# Patient Record
Sex: MALE | Race: WHITE | NOT HISPANIC OR LATINO | URBAN - METROPOLITAN AREA
[De-identification: names, ages, dates, MRNs, and addresses within clinical notes are randomized per-mention and may not be internally consistent; named-entity substitution may affect disease eponyms.]

---

## 2024-01-25 ENCOUNTER — OFFICE VISIT (OUTPATIENT)
Dept: URBAN - METROPOLITAN AREA CLINIC 46 | Facility: CLINIC | Age: 64
End: 2024-01-25
Payer: MEDICARE

## 2024-01-25 VITALS
SYSTOLIC BLOOD PRESSURE: 110 MMHG | HEIGHT: 69 IN | TEMPERATURE: 98.4 F | WEIGHT: 170.2 LBS | BODY MASS INDEX: 25.21 KG/M2 | DIASTOLIC BLOOD PRESSURE: 62 MMHG | HEART RATE: 71 BPM

## 2024-01-25 DIAGNOSIS — R13.13 DYSPHAGIA, PHARYNGEAL PHASE: ICD-10-CM

## 2024-01-25 PROCEDURE — 99204 OFFICE O/P NEW MOD 45 MIN: CPT | Performed by: INTERNAL MEDICINE

## 2024-01-25 RX ORDER — DICYCLOMINE HYDROCHLORIDE 20 MG/1
1 TABLET TABLET ORAL THREE TIMES A DAY
Qty: 30 TABLET | Status: ACTIVE | COMMUNITY

## 2024-01-25 RX ORDER — SIMETHICONE 125 MG
1 CAPSULE AFTER MEALS AND AT BEDTIME AS NEEDED CAPSULE ORAL
Status: ACTIVE | COMMUNITY
Start: 2024-01-25

## 2024-01-25 RX ORDER — APIXABAN 5 MG/1
1 TABLET TABLET, FILM COATED ORAL TWICE A DAY
Status: ACTIVE | COMMUNITY

## 2024-01-25 RX ORDER — METOCLOPRAMIDE 10 MG/1
1 TABLET BEFORE MEALS TABLET ORAL TWICE A DAY
Qty: 16 TABLET | Status: ACTIVE | COMMUNITY

## 2024-01-25 NOTE — HPI-TODAY'S VISIT:
62-year-old male is here for evaluation of dysphagia.  He is having dysphagia particularly to solids with steak chicken for the past month.  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  He is able to swallow soft foods like mashed potatoes.

## 2024-01-26 ENCOUNTER — TELEPHONE ENCOUNTER (OUTPATIENT)
Dept: URBAN - METROPOLITAN AREA CLINIC 46 | Facility: CLINIC | Age: 64
End: 2024-01-26

## 2024-01-28 ENCOUNTER — LAB OUTSIDE AN ENCOUNTER (OUTPATIENT)
Dept: URBAN - METROPOLITAN AREA CLINIC 46 | Facility: CLINIC | Age: 64
End: 2024-01-28

## 2024-01-30 ENCOUNTER — CLAIMS CREATED FROM THE CLAIM WINDOW (OUTPATIENT)
Dept: URBAN - METROPOLITAN AREA CLINIC 4 | Facility: CLINIC | Age: 64
End: 2024-01-30
Payer: MEDICARE

## 2024-01-30 ENCOUNTER — CLAIMS CREATED FROM THE CLAIM WINDOW (OUTPATIENT)
Dept: URBAN - METROPOLITAN AREA SURGERY CENTER 28 | Facility: SURGERY CENTER | Age: 64
End: 2024-01-30
Payer: MEDICARE

## 2024-01-30 ENCOUNTER — LAB OUTSIDE AN ENCOUNTER (OUTPATIENT)
Dept: URBAN - METROPOLITAN AREA CLINIC 44 | Facility: CLINIC | Age: 64
End: 2024-01-30

## 2024-01-30 ENCOUNTER — LAB OUTSIDE AN ENCOUNTER (OUTPATIENT)
Dept: URBAN - METROPOLITAN AREA CLINIC 46 | Facility: CLINIC | Age: 64
End: 2024-01-30

## 2024-01-30 ENCOUNTER — TELEPHONE ENCOUNTER (OUTPATIENT)
Dept: URBAN - METROPOLITAN AREA CLINIC 44 | Facility: CLINIC | Age: 64
End: 2024-01-30

## 2024-01-30 DIAGNOSIS — R13.19 CERVICAL DYSPHAGIA: ICD-10-CM

## 2024-01-30 DIAGNOSIS — C15.5 ADENOCARCINOMA OF LOWER ESOPHAGUS: ICD-10-CM

## 2024-01-30 DIAGNOSIS — K22.89 OTHER SPECIFIED DISEASE OF ESOPHAGUS: ICD-10-CM

## 2024-01-30 DIAGNOSIS — C15.9 MALIGNANT NEOPLASM OF ESOPHAGUS, UNSPECIFIED: ICD-10-CM

## 2024-01-30 DIAGNOSIS — C15.5 MALIGNANT NEOPLASM OF LOWER THIRD OF ESOPHAGUS: ICD-10-CM

## 2024-01-30 DIAGNOSIS — R13.10 DYSPHAGIA: ICD-10-CM

## 2024-01-30 PROCEDURE — 43239 EGD BIOPSY SINGLE/MULTIPLE: CPT | Performed by: INTERNAL MEDICINE

## 2024-01-30 PROCEDURE — 88305 TISSUE EXAM BY PATHOLOGIST: CPT | Performed by: PATHOLOGY

## 2024-01-30 PROCEDURE — G8907 PT DOC NO EVENTS ON DISCHARG: HCPCS | Performed by: INTERNAL MEDICINE

## 2024-01-30 PROCEDURE — 88341 IMHCHEM/IMCYTCHM EA ADD ANTB: CPT | Performed by: PATHOLOGY

## 2024-01-30 PROCEDURE — 00731 ANES UPR GI NDSC PX NOS: CPT | Performed by: NURSE ANESTHETIST, CERTIFIED REGISTERED

## 2024-01-30 PROCEDURE — 88342 IMHCHEM/IMCYTCHM 1ST ANTB: CPT | Performed by: PATHOLOGY

## 2024-01-30 RX ORDER — APIXABAN 5 MG/1
1 TABLET TABLET, FILM COATED ORAL TWICE A DAY
Status: ACTIVE | COMMUNITY

## 2024-01-30 RX ORDER — METOCLOPRAMIDE 10 MG/1
1 TABLET BEFORE MEALS TABLET ORAL TWICE A DAY
Qty: 16 TABLET | Status: ACTIVE | COMMUNITY

## 2024-01-30 RX ORDER — DICYCLOMINE HYDROCHLORIDE 20 MG/1
1 TABLET TABLET ORAL THREE TIMES A DAY
Qty: 30 TABLET | Status: ACTIVE | COMMUNITY

## 2024-01-30 RX ORDER — SIMETHICONE 125 MG
1 CAPSULE AFTER MEALS AND AT BEDTIME AS NEEDED CAPSULE ORAL
Status: ACTIVE | COMMUNITY
Start: 2024-01-25

## 2024-01-31 ENCOUNTER — TELEPHONE ENCOUNTER (OUTPATIENT)
Dept: URBAN - METROPOLITAN AREA CLINIC 44 | Facility: CLINIC | Age: 64
End: 2024-01-31

## 2024-02-06 ENCOUNTER — LAB (OUTPATIENT)
Dept: URBAN - METROPOLITAN AREA CLINIC 46 | Facility: CLINIC | Age: 64
End: 2024-02-06

## 2024-03-29 ENCOUNTER — TELPHEP (OUTPATIENT)
Dept: URBAN - METROPOLITAN AREA TELEHEALTH 2 | Facility: TELEHEALTH | Age: 64
End: 2024-03-29
Payer: MEDICARE

## 2024-03-29 ENCOUNTER — LAB (OUTPATIENT)
Dept: URBAN - METROPOLITAN AREA TELEHEALTH 2 | Facility: TELEHEALTH | Age: 64
End: 2024-03-29

## 2024-03-29 DIAGNOSIS — K57.90 DIVERTICULOSIS: ICD-10-CM

## 2024-03-29 DIAGNOSIS — Z86.010 PERSONAL HISTORY OF COLONIC POLYPS: ICD-10-CM

## 2024-03-29 DIAGNOSIS — K62.7 RADIATION PROCTITIS: ICD-10-CM

## 2024-03-29 DIAGNOSIS — K76.9 LIVER LESION: ICD-10-CM

## 2024-03-29 DIAGNOSIS — C15.5 MALIGNANT NEOPLASM OF LOWER THIRD OF ESOPHAGUS: ICD-10-CM

## 2024-03-29 DIAGNOSIS — K62.5 RECTAL BLEEDING: ICD-10-CM

## 2024-03-29 PROBLEM — 300331000: Status: ACTIVE | Noted: 2024-03-29

## 2024-03-29 PROBLEM — 12063002: Status: ACTIVE | Noted: 2024-03-29

## 2024-03-29 PROBLEM — 397881000: Status: ACTIVE | Noted: 2024-03-29

## 2024-03-29 PROBLEM — 235760009: Status: ACTIVE | Noted: 2024-03-29

## 2024-03-29 PROBLEM — 428283002: Status: ACTIVE | Noted: 2024-03-29

## 2024-03-29 PROCEDURE — 99214 OFFICE O/P EST MOD 30 MIN: CPT | Performed by: NURSE PRACTITIONER

## 2024-03-29 RX ORDER — DICYCLOMINE HYDROCHLORIDE 20 MG/1
1 TABLET TABLET ORAL THREE TIMES A DAY
Qty: 30 TABLET | Status: ACTIVE | COMMUNITY

## 2024-03-29 RX ORDER — SIMETHICONE 125 MG
1 CAPSULE AFTER MEALS AND AT BEDTIME AS NEEDED CAPSULE ORAL
Status: ACTIVE | COMMUNITY
Start: 2024-01-25

## 2024-03-29 RX ORDER — METOCLOPRAMIDE 10 MG/1
1 TABLET BEFORE MEALS TABLET ORAL TWICE A DAY
Qty: 16 TABLET | Status: ACTIVE | COMMUNITY

## 2024-03-29 RX ORDER — APIXABAN 5 MG/1
1 TABLET TABLET, FILM COATED ORAL TWICE A DAY
Status: ACTIVE | COMMUNITY

## 2024-03-29 NOTE — HPI-TODAY'S VISIT:
63-year-old male presents via telehealth for evaluation of rectal bleeding. Bowel movements are daily with bright red blood with or without a bowel movement. A colonoscopy was recommended to rule out neoplasm. Last colonoscopy was 6/2023 per Dr. Rivera, at which time he had multiple tubular adenomas, diverticulitis, radiation proctitis, and internal hemorrhoids. Endo clips were placed after polyp removal. A 1-year recall was recommended. The patient has a history of esophageal cancer diagnosed per EGD in 1/2024. He has to eat small bites and liquids due to dysphagia. The patient supplements with protein. Status post PEG placement 6 weeks ago. Recent CT and PET show distal esophageal mass and metastisis to the liver along with adjacent small lymph nodes and mesenteric induration in the right upper abdomen suggestive of metastasis. He is followed by oncologist Dr. Bobo he is on chemotherapy and radiation. Last radiation was 3/28/24 and last chemo was last week, and he is to do more due to new diagnosis. Labs 3/2024 Hgb 10, , platelets 321. The patient is on Eliquis due to hx of DVT.

## 2024-04-03 ENCOUNTER — COLON (OUTPATIENT)
Dept: URBAN - METROPOLITAN AREA MEDICAL CENTER 35 | Facility: MEDICAL CENTER | Age: 64
End: 2024-04-03

## 2024-08-02 ENCOUNTER — OFFICE VISIT (OUTPATIENT)
Dept: URBAN - METROPOLITAN AREA TELEHEALTH 2 | Facility: TELEHEALTH | Age: 64
End: 2024-08-02

## 2024-11-13 ENCOUNTER — CLAIMS CREATED FROM THE CLAIM WINDOW (OUTPATIENT)
Dept: URBAN - METROPOLITAN AREA MEDICAL CENTER 35 | Facility: MEDICAL CENTER | Age: 64
End: 2024-11-13
Payer: COMMERCIAL

## 2024-11-13 DIAGNOSIS — R18.8 ABDOMINAL ASCITES: ICD-10-CM

## 2024-11-13 DIAGNOSIS — Z85.01 PERSONAL HISTORY OF ESOPHAGEAL CANCER: ICD-10-CM

## 2024-11-13 DIAGNOSIS — K92.0 HEMATEMESIS: ICD-10-CM

## 2024-11-13 DIAGNOSIS — D69.6 ACQUIRED AMEGAKARYOCYTIC THROMBOCYTOPENIA: ICD-10-CM

## 2024-11-13 PROCEDURE — 99253 IP/OBS CNSLTJ NEW/EST LOW 45: CPT | Performed by: PHYSICIAN ASSISTANT

## 2024-11-13 PROCEDURE — 99221 1ST HOSP IP/OBS SF/LOW 40: CPT | Performed by: PHYSICIAN ASSISTANT
